# Patient Record
(demographics unavailable — no encounter records)

---

## 2025-02-07 NOTE — PHYSICAL EXAM
[No Acute Distress] : no acute distress [Well Nourished] : well nourished [Well Developed] : well developed [Well-Appearing] : well-appearing [Normal Sclera/Conjunctiva] : normal sclera/conjunctiva [PERRL] : pupils equal round and reactive to light [EOMI] : extraocular movements intact [Normal Outer Ear/Nose] : the outer ears and nose were normal in appearance [Normal Oropharynx] : the oropharynx was normal [No JVD] : no jugular venous distention [No Lymphadenopathy] : no lymphadenopathy [Supple] : supple [Thyroid Normal, No Nodules] : the thyroid was normal and there were no nodules present [No Respiratory Distress] : no respiratory distress  [No Accessory Muscle Use] : no accessory muscle use [Clear to Auscultation] : lungs were clear to auscultation bilaterally [Normal Rate] : normal rate  [Regular Rhythm] : with a regular rhythm [Normal S1, S2] : normal S1 and S2 [No Murmur] : no murmur heard [No Carotid Bruits] : no carotid bruits [No Abdominal Bruit] : a ~M bruit was not heard ~T in the abdomen [No Varicosities] : no varicosities [Pedal Pulses Present] : the pedal pulses are present [No Edema] : there was no peripheral edema [No Palpable Aorta] : no palpable aorta [No Extremity Clubbing/Cyanosis] : no extremity clubbing/cyanosis [Soft] : abdomen soft [Non Tender] : non-tender [Non-distended] : non-distended [No Masses] : no abdominal mass palpated [No HSM] : no HSM [Normal Bowel Sounds] : normal bowel sounds [Normal Posterior Cervical Nodes] : no posterior cervical lymphadenopathy [Normal Anterior Cervical Nodes] : no anterior cervical lymphadenopathy [No CVA Tenderness] : no CVA  tenderness [No Spinal Tenderness] : no spinal tenderness [No Rash] : no rash [Coordination Grossly Intact] : coordination grossly intact [No Focal Deficits] : no focal deficits [Normal Gait] : normal gait [Deep Tendon Reflexes (DTR)] : deep tendon reflexes were 2+ and symmetric [Normal Affect] : the affect was normal [Normal Insight/Judgement] : insight and judgment were intact [de-identified] : left bka

## 2025-02-07 NOTE — HEALTH RISK ASSESSMENT
[No] : No [No falls in past year] : Patient reported no falls in the past year [Little interest or pleasure doing things] : 1) Little interest or pleasure doing things [Feeling down, depressed, or hopeless] : 2) Feeling down, depressed, or hopeless [0] : 2) Feeling down, depressed, or hopeless: Not at all (0) [PHQ-2 Negative - No further assessment needed] : PHQ-2 Negative - No further assessment needed [HIV test declined] : HIV test declined [Hepatitis C test declined] : Hepatitis C test declined [None] : None [With Family] : lives with family [Never] : Never [QEL1Pagkg] : 0 [Change in mental status noted] : No change in mental status noted [Language] : denies difficulty with language [Behavior] : denies difficulty with behavior [Learning/Retaining New Information] : denies difficulty learning/retaining new information [Handling Complex Tasks] : denies difficulty handling complex tasks [Reasoning] : denies difficulty with reasoning [Spatial Ability and Orientation] : denies difficulty with spatial ability and orientation

## 2025-02-07 NOTE — HISTORY OF PRESENT ILLNESS
[Post-hospitalization from ___ Hospital] : Post-hospitalization from [unfilled] Hospital [Admitted on: ___] : The patient was admitted on [unfilled] [Discharged on ___] : discharged on [unfilled] [Patient Contacted By: ____] : and contacted by [unfilled]

## 2025-02-24 NOTE — PHYSICAL EXAM
[Normal Breath Sounds] : Normal breath sounds [2+] : left 2+ [Abdomen Tenderness] : ~T ~M No abdominal tenderness [Alert] : alert [Oriented to Person] : oriented to person [Oriented to Place] : oriented to place [Oriented to Time] : oriented to time [Calm] : calm [de-identified] : Appears well [FreeTextEntry1] : L BKA with sutures and staples in place minimal skin necrosis on medial aspect, non tender to touch [de-identified] : Intact staple/suture incision site Left BKA

## 2025-02-24 NOTE — ASSESSMENT
[FreeTextEntry1] : 52 YO M s/p L BKA  - sutures removed. Return to clinic in 2 weeks for ongoing monitoring

## 2025-02-24 NOTE — HISTORY OF PRESENT ILLNESS
[FreeTextEntry1] : BENJY CHUN is status post LLE BKA and he is here for a post-op visit.   Surgery Date: 1/22/24 52 YO M with uncontrolled DM presents for post op visit after L 2 stage BKA. He is off antibiotics. No fevers/ chills at home.  Patient accompanied by spouse. [de-identified] : Denies fevers at home. Taking augmentin. Here for suture removal

## 2025-02-27 NOTE — HISTORY OF PRESENT ILLNESS
[FreeTextEntry1] : Patient is here with his niece Sirena.  Patient lives with family.   HPI: 52 y/o Azeri speaking M w/ PMH of IDDM II, HTN, HLD, not compliant with medications other than insulin regimen, presents w/ 3 days of worsening foot swelling and pain. He had left above the knee amputee during hospital admission.  Seen Dr. Espinosa [facility]  Quality: Type 2 DM Severity: uncontrolled Duration: 8 years ago  Onset: vomiting went to ER  Modifying Factors: better with insulin Associated Symptoms: left leg amputee due to infection on toe  FAMILY HISTORY: No pertinent family history in first degree relatives  SOCIAL HISTORY: No etoh abuse, no smoking, no recreational drugs  Current Regimen: consistent medication use Metformin 1000 mg BID Insulin Aspart 14 units before meals   Lantus 30 units at bedtime   Self blood sugar monitoring: 3-4 times a day per logs fastin, 67, 132, 63, 137, 159 before breakfast: 148, 198, 232, 157 before lunch: 193, 143, 242, 171, 154 before dinner: 170, 97, 90, 88, 141 Current BG 82  Labs on 2025 A1c 13.5% Creatinine 0.86    Diet: 3 meals a day    Date of last eye exam: 2025 +DR, receiving injections Date of last foot exam: followed by podiatry  Date of last flu vaccine: no Date of last Pneumovax: no  PAST MEDICAL & SURGICAL HISTORY: Diabetes  No significant past surgical history

## 2025-02-27 NOTE — PHYSICAL EXAM
[Alert] : alert [Well Nourished] : well nourished [No Acute Distress] : no acute distress [Well Developed] : well developed [Normal Sclera/Conjunctiva] : normal sclera/conjunctiva [No Proptosis] : no proptosis [No LAD] : no lymphadenopathy [Thyroid Not Enlarged] : the thyroid was not enlarged [No Thyroid Nodules] : no palpable thyroid nodules [No Respiratory Distress] : no respiratory distress [No Accessory Muscle Use] : no accessory muscle use [Normal Rate and Effort] : normal respiratory rate and effort [Clear to Auscultation] : lungs were clear to auscultation bilaterally [Normal S1, S2] : normal S1 and S2 [Normal Rate] : heart rate was normal [Regular Rhythm] : with a regular rhythm [Normal Bowel Sounds] : normal bowel sounds [Not Tender] : non-tender [Soft] : abdomen soft [No Rash] : no rash [Acanthosis Nigricans] : no acanthosis nigricans [No Tremors] : no tremors [Oriented x3] : oriented to person, place, and time [Normal Affect] : the affect was normal [Normal Insight/Judgement] : insight and judgment were intact [Normal Mood] : the mood was normal [de-identified] : left above the knee amputation

## 2025-02-27 NOTE — REVIEW OF SYSTEMS
[Recent Weight Gain (___ Lbs)] : recent weight gain: [unfilled] lbs [Eye Pain] : pain [Blurred Vision] : blurred vision [Fatigue] : no fatigue [Decreased Appetite] : appetite not decreased [Visual Field Defect] : no visual field defect [Dysphagia] : no dysphagia [Neck Pain] : no neck pain [Dysphonia] : no dysphonia [Chest Pain] : no chest pain [Palpitations] : no palpitations [Constipation] : no constipation [Diarrhea] : no diarrhea [Polyuria] : no polyuria [Dysuria] : no dysuria [Headaches] : no headaches [Tremors] : no tremors [Depression] : no depression [Anxiety] : no anxiety [Polydipsia] : no polydipsia [FreeTextEntry3] : sometimes blurry vision, sees eye doctor

## 2025-02-27 NOTE — ASSESSMENT
[FreeTextEntry1] : 51M with PMH of IDDM II, HTN, HLD.  1. Uncontrolled DM2, a1c 13.5% (was taking premeal insulin after meals at home), patient having low blood sugars in the morning and higher blood sugars before breakfast and dinner.  - Start Alogliptin, prescribed by PMD - Increase admelog 16-16-14 units to help with hyperglycemia - Decrease lantus to 26 units qhs to prevent hypoglycemia - Continue Metformin  - Continue correction scale TID with meals - Diabetes education  2. HTN: BP mildly elevated - follow up with PMD for management.   3. HLD: unclear why statin was discontinued, will check lipid panel before next visit. May benefit from starting statin in the future.   RTO in 1-2 weeks with CDE to start CGM RTO in 6 weeks with NP RTO in 6 months with Dr. Espinosa

## 2025-03-02 NOTE — HISTORY OF PRESENT ILLNESS
[FreeTextEntry1] : Hospital Course: Discharge Date	01-Feb-2025 Admission Date	15-William-2025 20:43 Reason for Admission	LLE cellulitis w/ impending nec fasc Hospital Course	 Pt is a 51yMale with a PMHx of T2DM and HTN who presented with left foot ulcer. Patient had excisional debridement of necrotizing fasciitis of the left ankle and foot on 1/16/25 with OR cultures positive for Streptococcus Pyogenes. Patient underwent further debridement and transmetatarsal amputation on 1/17 with repeat OR cultures still with strep pyogenes. Patient was on IV Zosyn, Vanc and Clindamycin. Course also c/b Left knee swelling s/p knee arthrocentesis with Ortho with cultures with NGTD. Patient continued to spike fevers s/p above with persistent leukocytosis prompting Left knee BKA on 1/22/25 and formalization of Left BKA 1/28/25. Patient had prevena wound vac in place x5 days s/p surgery. He was also seen by Endocrine for diabetes management for A1c of 13.5. He completed course of antibiotics and was evaluated by PT with recommended for Home PT. Patient is now stable for discharge with Home PT.   Interval f/u 2/17/25 pt here for f/u visit saw vascular surgeon Dr Grant last week, was started on augmentin for cellulitis at surgical site pt tolerating augmentin denies pain no diarrhea

## 2025-03-02 NOTE — ASSESSMENT
[FreeTextEntry1] : 50 y/o Cymro speaking M w/ PMH of IDDM II, HTN, HLD  Admitted for LLE necrotising fascitis  s/p OR I+D on 1/16/25 ultimately requiring BKA here for post hospitalization visit. He saw vascular surgery last week and was started on augmentin for mild cellulitis at surgical site  LLE necrotising fascitis Uncontrolled DM hba1c 13.2 Cellulitis  - mrsa screen neg - OR culture 1/16 with Streptococcus pyogenes (GAS) - BCX 1/15 ngtd - s/p 1/16/25- I+D and excisional debridement- OR findings=Necrotizing infection of dorsal foot, Skin and subQ debrided down to level of tendon sheaths - s/p 1/17/25 deep I+D with lisfranc amputation - OR cx 1/17 strep pyogenes - s/p left knee arthrocentesis 1/16. low cell count. f/u cultures - ngtd  - initially refused amputation later agreed - now  1/22 s/p L BKA guillotine- OR findings- muscle on medial side seemed viable, muscle on lateral side was questionable - s/p 1/29 L guillotine BKA formalizatio- OR fidnings-> No infection appreciated on revision of BKA stump, distal margin excised.  - saw vascular surgery and started on augmentin for cellulitis at surgical site. Appears improved compared to images from last week. Will continue augmentin - optimize glycemic control - check cbc cmp esr crp  f/u 2 weeks   all questions and concerned addressed interpretation provided by family member [Treatment Education] : treatment education [Treatment Adherence] : treatment adherence [Anticipatory Guidance] : anticipatory guidance

## 2025-03-02 NOTE — PHYSICAL EXAM
[General Appearance - Alert] : alert [General Appearance - In No Acute Distress] : in no acute distress [Auscultation Breath Sounds / Voice Sounds] : lungs were clear to auscultation bilaterally [Heart Rate And Rhythm] : heart rate was normal and rhythm regular [Heart Sounds] : normal S1 and S2 [Heart Sounds Gallop] : no gallops [Murmurs] : no murmurs [Heart Sounds Pericardial Friction Rub] : no pericardial rub [FreeTextEntry1] : left LE stump with staples intact slight erythema swelling improved compared to images reviewed with surgeon last week no warmth.  [Bowel Sounds] : normal bowel sounds [Abdomen Soft] : soft [Abdomen Tenderness] : non-tender [] : no hepato-splenomegaly [Abdomen Mass (___ Cm)] : no abdominal mass palpated [Costovertebral Angle Tenderness] : no CVA tenderness

## 2025-03-02 NOTE — ASSESSMENT
[FreeTextEntry1] : 50 y/o Scottish speaking M w/ PMH of IDDM II, HTN, HLD  Admitted for LLE necrotising fascitis  s/p OR I+D on 1/16/25 ultimately requiring BKA here for post hospitalization visit. He saw vascular surgery last week and was started on augmentin for mild cellulitis at surgical site  LLE necrotising fascitis Uncontrolled DM hba1c 13.2 Cellulitis  - mrsa screen neg - OR culture 1/16 with Streptococcus pyogenes (GAS) - BCX 1/15 ngtd - s/p 1/16/25- I+D and excisional debridement- OR findings=Necrotizing infection of dorsal foot, Skin and subQ debrided down to level of tendon sheaths - s/p 1/17/25 deep I+D with lisfranc amputation - OR cx 1/17 strep pyogenes - s/p left knee arthrocentesis 1/16. low cell count. f/u cultures - ngtd  - initially refused amputation later agreed - now  1/22 s/p L BKA guillotine- OR findings- muscle on medial side seemed viable, muscle on lateral side was questionable - s/p 1/29 L guillotine BKA formalizatio- OR fidnings-> No infection appreciated on revision of BKA stump, distal margin excised.  - saw vascular surgery and started on augmentin for cellulitis at surgical site. Appears improved compared to images from last week. Will continue augmentin - optimize glycemic control - check cbc cmp esr crp  f/u 2 weeks   all questions and concerned addressed interpretation provided by family member [Treatment Education] : treatment education [Treatment Adherence] : treatment adherence [Anticipatory Guidance] : anticipatory guidance

## 2025-03-02 NOTE — REASON FOR VISIT
[Post Hospitalization] : a post hospitalization visit
[Post Hospitalization] : a post hospitalization visit
17

## 2025-03-03 NOTE — ASSESSMENT
[FreeTextEntry1] : 50 y/o Chinese speaking M w/ PMH of IDDM II, HTN, HLD  Admitted for LLE necrotising fascitis  s/p OR I+D on 1/16/25 ultimately requiring BKA here for post hospitalization visit. He saw vascular surgery last week and was started on augmentin for mild cellulitis at surgical site  LLE necrotising fascitis Uncontrolled DM hba1c 13.2 Cellulitis  - mrsa screen neg - OR culture 1/16 with Streptococcus pyogenes (GAS) - BCX 1/15 ngtd - s/p 1/16/25- I+D and excisional debridement- OR findings=Necrotizing infection of dorsal foot, Skin and subQ debrided down to level of tendon sheaths - s/p 1/17/25 deep I+D with lisfranc amputation - OR cx 1/17 strep pyogenes - s/p left knee arthrocentesis 1/16. low cell count. f/u cultures - ngtd  - initially refused amputation later agreed - now  1/22 s/p L BKA guillotine- OR findings- muscle on medial side seemed viable, muscle on lateral side was questionable - s/p 1/29 L guillotine BKA formalizatio- OR fidnings-> No infection appreciated on revision of BKA stump, distal margin excised.  - saw vascular surgery and started on augmentin for cellulitis at surgical site. Appears improved compared to images from last week. Will continue augmentin - optimize glycemic control - check cbc cmp esr crp  f/u 2 weeks   all questions and concerned addressed interpretation provided by family member [Treatment Education] : treatment education [Treatment Adherence] : treatment adherence [Anticipatory Guidance] : anticipatory guidance

## 2025-03-03 NOTE — HISTORY OF PRESENT ILLNESS
[FreeTextEntry1] : Hospital Course: Discharge Date	01-Feb-2025 Admission Date	15-William-2025 20:43 Reason for Admission	LLE cellulitis w/ impending nec fasc Hospital Course	 Pt is a 51yMale with a PMHx of T2DM and HTN who presented with left foot ulcer. Patient had excisional debridement of necrotizing fasciitis of the left ankle and foot on 1/16/25 with OR cultures positive for Streptococcus Pyogenes. Patient underwent further debridement and transmetatarsal amputation on 1/17 with repeat OR cultures still with strep pyogenes. Patient was on IV Zosyn, Vanc and Clindamycin. Course also c/b Left knee swelling s/p knee arthrocentesis with Ortho with cultures with NGTD. Patient continued to spike fevers s/p above with persistent leukocytosis prompting Left knee BKA on 1/22/25 and formalization of Left BKA 1/28/25. Patient had prevena wound vac in place x5 days s/p surgery. He was also seen by Endocrine for diabetes management for A1c of 13.5. He completed course of antibiotics and was evaluated by PT with recommended for Home PT. Patient is now stable for discharge with Home PT.   Interval f/u 2/17/25 pt here for f/u visit saw vascular surgeon Dr Grant last week, was started on augmentin for cellulitis at surgical site pt tolerating augmentin denies pain no diarrhea   Interval f/u 3/3/25 last day augmentin today feels good staples removed some scab at medial end otherwise healed  plan complete augmentin today f/u vascular f/u ID as needed

## 2025-03-07 NOTE — ASSESSMENT
[FreeTextEntry1] : dm doing better bp stable renew meds hld to be checked bka next step will be prosthetic

## 2025-03-07 NOTE — HISTORY OF PRESENT ILLNESS
[FreeTextEntry1] : follow up  [de-identified] : follow up iddm s/p left amputation has been doing great

## 2025-03-07 NOTE — HEALTH RISK ASSESSMENT
[No] : No [No falls in past year] : Patient reported no falls in the past year [Little interest or pleasure doing things] : 1) Little interest or pleasure doing things [0] : 2) Feeling down, depressed, or hopeless: Not at all (0) [PHQ-2 Negative - No further assessment needed] : PHQ-2 Negative - No further assessment needed [[id number]] : 0 [Never] : Never

## 2025-03-10 NOTE — ASSESSMENT
[FreeTextEntry1] : 52 YO M s/p L BKA with medial incision necrosis. Medial incision debrided in office today. Silvercell dressing, followed by kerlix and ace bandage applied. Follow up in 2 weeks for wound check.

## 2025-03-10 NOTE — PHYSICAL EXAM
[Normal Rate and Rhythm] : normal rate and rhythm [Alert] : alert [Oriented to Person] : oriented to person [Oriented to Place] : oriented to place [Oriented to Time] : oriented to time [Calm] : calm [de-identified] : Appears well, in no acute distress. [de-identified] : normocephalic, atraumatic [de-identified] :   normal respiratory effort. unlabored breathing. [de-identified] : L BKA incision with minimal medial skin necrosis, no signs of infection

## 2025-03-10 NOTE — HISTORY OF PRESENT ILLNESS
[FreeTextEntry1] : BENJY CHUN is status post LLE BKA and he is here for a post-op visit.   Surgery Date: 1/22/24 52 YO M with uncontrolled DM presents for post op visit after L 2 stage BKA. He is off antibiotics. No fevers/ chills at home.  Patient accompanied by spouse. [de-identified] : Patient s/p JANET HOLLIS presents for follow up. He has completed Augmentin. Denies fevers and chills.

## 2025-04-07 NOTE — PHYSICAL EXAM
[Normal Breath Sounds] : Normal breath sounds [2+] : left 2+ [Abdomen Tenderness] : ~T ~M No abdominal tenderness [Alert] : alert [Oriented to Person] : oriented to person [Oriented to Place] : oriented to place [Oriented to Time] : oriented to time [Calm] : calm [de-identified] : Appears well [FreeTextEntry1] : healed L BKA pencil eraser size area of denuded skin

## 2025-04-07 NOTE — ASSESSMENT
[FreeTextEntry1] : 50 YO M with uncontrolled DM now 2 months post op from 2 stage L BKA. Doing well - BKA incision is now healed. He may leave it open to air and begin fitting for prosthetic - return to vascular clinic as needed

## 2025-04-07 NOTE — HISTORY OF PRESENT ILLNESS
[FreeTextEntry1] : BENJY CHUN is status post LLE BKA and he is here for a post-op visit.   Surgery Date: 1/22/24 52 YO M with uncontrolled DM presents for post op visit after L 2 stage BKA. He is off antibiotics. No fevers/ chills at home.  Patient accompanied by spouse. [de-identified] : Patient returns for incision check. Staples all removed. Has been applying daily xeroform to medial aspect of incision. The incision is now nearly completely healed. He has a superficial area the size of pencil eraser that remains open

## 2025-04-21 NOTE — ASSESSMENT
[FreeTextEntry1] : 51-year-old male s/p LLE BKA with suture protruding. Area was cleansed with betadine and protruding PDS suture was trimmed and removed. Skin intact small Band-Aid applied. Overlying scab from medial incision was removed revealing new healed skin.  Patient and daughter was advised to follow up if he develops any open wounds.

## 2025-04-21 NOTE — PHYSICAL EXAM
[Normal Rate and Rhythm] : normal rate and rhythm [Alert] : alert [Oriented to Person] : oriented to person [Oriented to Place] : oriented to place [Oriented to Time] : oriented to time [Calm] : calm [de-identified] : Appears well, in no acute distress. [de-identified] : normocephalic, atraumatic [de-identified] :   normal respiratory effort. unlabored breathing. [de-identified] : LLE BKA incision healed with Left medial incision superficial pencil eraser sized area with lifting scab, mid incision there is tail of suture protruding healed incision, no staples incision otherwise closed, no signs of infection

## 2025-04-21 NOTE — HISTORY OF PRESENT ILLNESS
[de-identified] : Patient presents for follow up accompanied by daughter reporting staple was left in L BKA incision. He had staple and suture removal on April 7th. Daughter reports they felt a staple under the skin about 2 weeks ago. Denies lower extremity pain, drainage, fevers and chills. Otherwise doing well LLE BKA essentially healed except for medial incision scab which reports was previously open. Reports had been putting xeroform but for last week been open to air and has healed now with a scab. Daughter reports he is doing well A1c reduced from 13 to 6.

## 2025-05-09 NOTE — ASSESSMENT
[FreeTextEntry1] : Diabetic peripheral neuropathy Educated on risks dont walk barefoot States is painful interested in gabapentin/lyrica started on low dose gabapentin for pain relief discussed risks/benefits/potential side effects encouraged diabetic diet  I then counseled the patient on performing self-examination of the feet on a daily basis. I explained the importance of this due to the diminished sensation and the greater susceptibility of getting an infection and having additional complications due to the medical condition that exists, especially if they lack protective sensation on their feet. I advised the patient to notify the office right away if increased redness, swelling, pain, open wounds or discharge were observed. I explained the importance of checking the glucose regularly and of keeping the glucose level between 90 -110 and more importantly controlling the HbA1C keeping consistent and trying to achieve as close to normal and HbA1C as possible around 6.0. I told the patient to notify the MD if the glucose level changed to above or below those levels. Advised to check feet daily and do not walk barefoot  Hx of left bka has prosthetic appointment encouraged physical therapy   #onychomycosis Discussed diagnosis and treatment with patient  Discussed etiology of symptoms patient is experiencing  Aseptic debridement of nails 1-5 right reducing the length with a sterile nail clipper manually and reduced girth by power cosme  Rx antifungal topical bid  Discussed proper shoe gear with patient  Discussed the importance of daily foot exams and pedal hygiene  Explained that the oral antifungal therapy will require 3 months in duration, and that patient must let nails grow out for total 9-12 months to observe clearance of nail mycosis  PTR 3 month evaluate neuropathy pain, high risk foot care

## 2025-05-09 NOTE — HISTORY OF PRESENT ILLNESS
[FreeTextEntry1] : BENJY  is a 51 year old male diagnosed with diabetes in 2017  Patient had BKA  left January 2025  Patient was initially seen at [facility]. He is present for high risk foot exam as recommended. Denies pain or open wounds to the right foot  Sees endocrinologist and vascular  last a1c 6.4% March hx of a1c >13  FBS 87  admits burning tingling foot

## 2025-05-09 NOTE — PHYSICAL EXAM
[General Appearance - Alert] : alert [General Appearance - In No Acute Distress] : in no acute distress [2+] : right foot dorsalis pedis 2+ [Vibration Dec.] : diminished vibratory sensation at the level of the toes [FreeTextEntry3] : LORETTA HOLLIS  [FreeTextEntry1] : Thickened mycotic nails x 5 Right foot No open lesions or lacerations  [FreeTextEntry4] : Loss of protective sensation right foot

## 2025-05-15 NOTE — PHYSICAL EXAM
[Normal Breath Sounds] : Normal breath sounds [2+] : left 2+ [Abdomen Tenderness] : ~T ~M No abdominal tenderness [Alert] : alert [Oriented to Person] : oriented to person [Oriented to Place] : oriented to place [Oriented to Time] : oriented to time [Calm] : calm [de-identified] : Appears well [FreeTextEntry1] : L BKA central area of erythema with dime sized ulceration

## 2025-05-15 NOTE — HISTORY OF PRESENT ILLNESS
[FreeTextEntry1] : BENJY CHUN is status post LLE BKA and he is here for a post-op visit.   Surgery Date: 1/22/24 50 YO M with uncontrolled DM presents for post op visit after L 2 stage BKA. He is off antibiotics. No fevers/ chills at home.  Patient accompanied by spouse. [de-identified] : Patient returns for small area of ulceration and erythema in central area of L BKA. Denies fevers/ chills at home and has not started wearing prosthetic yet

## 2025-05-15 NOTE — ASSESSMENT
[FreeTextEntry1] : 50 YO M with uncontrolled DM now 2 months post op from 2 stage L BKA with central area of superficial ulceration and cellulitis - I have discussed the case with Dr. Danyell Jimenez from ID who agrees patient will benefit from course of augmentin 875mg BID for 7 days - Return to vascular clinic in one week

## 2025-05-21 NOTE — ASSESSMENT
[FreeTextEntry1] : 52 YO M with uncontrolled DM now 2 months post op from 2 stage L BKA with central area of superficial ulceration and cellulitis -Patient completed 1 week of augmentin 875mg BID . Erythema improved.  I have discussed this case with Dr. LAZARUS Mendiola from infectious disease who request another week of antibiotics we will prescribe this Return to vascular clinic in 1 week he will also see infectious disease at that time

## 2025-05-21 NOTE — HISTORY OF PRESENT ILLNESS
[FreeTextEntry1] : BENJY CHUN is status post LLE BKA and he is here for a post-op visit.   Surgery Date: 1/22/24 50 YO M with uncontrolled DM presents for post op visit after L 2 stage BKA. He is off antibiotics. No fevers/ chills at home.  Patient accompanied by spouse. [de-identified] : Patient with L BKA small central  area of ulceration presents for follow up. He has completed 1 week of Augmentin. Denies fevers and chills.

## 2025-05-28 NOTE — ASSESSMENT
[FreeTextEntry1] : 51M with IDDM II, HTN, HLD.  1. Controlled DM2, a1c 6.4%, higher blood sugars throughout the day based on CGM - Continue Alogliptin, prescribed by PMD - Increase admelog by 2 additional units to help with hyperglycemia - Increase lantus to 29 units qhs to prevent hyperglycemia - Restart taking Metformin consistently  - Diabetes education, keep upcoming appointment with CDE  2. HTN: BP acceptable - follow up with PMD for management, continue monitor home BP  3. HLD: controlled, continue statin  4. Elevated TSH with normal Free T4, no medication recommended at this time, repeat TFTs before next visit.  Anemia - follow up with PMD. Continue to monitor.   RTO in 3 months with NP RTO in 6 months with Dr. Espinosa.

## 2025-05-28 NOTE — PHYSICAL EXAM
[Alert] : alert [Well Nourished] : well nourished [No Acute Distress] : no acute distress [Well Developed] : well developed [Normal Sclera/Conjunctiva] : normal sclera/conjunctiva [No Proptosis] : no proptosis [No LAD] : no lymphadenopathy [Thyroid Not Enlarged] : the thyroid was not enlarged [No Thyroid Nodules] : no palpable thyroid nodules [No Respiratory Distress] : no respiratory distress [No Accessory Muscle Use] : no accessory muscle use [Normal Rate and Effort] : normal respiratory rate and effort [Clear to Auscultation] : lungs were clear to auscultation bilaterally [Normal S1, S2] : normal S1 and S2 [Normal Rate] : heart rate was normal [Regular Rhythm] : with a regular rhythm [Normal Bowel Sounds] : normal bowel sounds [Not Tender] : non-tender [Soft] : abdomen soft [No Stigmata of Cushings Syndrome] : no stigmata of Cushings Syndrome [No Rash] : no rash [Acanthosis Nigricans] : no acanthosis nigricans [No Tremors] : no tremors [Oriented x3] : oriented to person, place, and time [Normal Affect] : the affect was normal [Normal Insight/Judgement] : insight and judgment were intact [Normal Mood] : the mood was normal [de-identified] : Left ABK amputee

## 2025-05-28 NOTE — HISTORY OF PRESENT ILLNESS
[Continuous Glucose Monitoring] : Continuous Glucose Monitoring: Yes [Dexcom] : Dexcom [FreeTextEntry1] : Patient is here with his niece Sirena. Patient lives with family.  No changes since last visit.   HPI: 50 y/o Occitan speaking M w/ PMH of IDDM II, HTN, HLD, not compliant with medications other than insulin regimen, presents w/ 3 days of worsening foot swelling and pain. He had left above the knee amputee during hospital admission. Seen Dr. Espinosa [facility]  Quality: Type 2 DM Severity: uncontrolled Duration: 8 years ago Onset: vomiting went to ER Modifying Factors: better with insulin Associated Symptoms: left leg amputee due to infection on toe FAMILY HISTORY: No pertinent family history in first degree relatives  SOCIAL HISTORY: No etoh abuse, no smoking, no recreational drugs  Current Regimen: consistent medication use Metformin 1000 mg BID - not taking consisently   Insulin Aspart 6-8 units before meals Lantus 26 units at bedtime  Self blood sugar monitoring: 3-4 times a day, Dexcom  Current BG 94  Labs  A1c 6.4%   Diet: 3 meals a day   Date of last eye exam: 1/2025 +DR, receiving injections Date of last foot exam: followed by podiatry Date of last flu vaccine: no Date of last Pneumovax: no  PAST MEDICAL & SURGICAL HISTORY: Diabetes  No significant past surgical history [FreeTextEntry2] : 32 [FreeTextEntry3] : 68 [FreeTextEntry4] : 0 [de-identified] : 8.2 [FreeTextEntry5] : 206 [FreeTextEntry6] : 27.6 [FreeTextEntry7] : 57

## 2025-05-28 NOTE — REVIEW OF SYSTEMS
[Blurred Vision] : blurred vision [Heartburn] : heartburn [Fatigue] : no fatigue [Decreased Appetite] : appetite not decreased [Recent Weight Gain (___ Lbs)] : no recent weight gain [Recent Weight Loss (___ Lbs)] : no recent weight loss [Visual Field Defect] : no visual field defect [Dysphagia] : no dysphagia [Neck Pain] : no neck pain [Dysphonia] : no dysphonia [Chest Pain] : no chest pain [Palpitations] : no palpitations [Constipation] : no constipation [Diarrhea] : no diarrhea [Polyuria] : no polyuria [Dysuria] : no dysuria [Headaches] : no headaches [Tremors] : no tremors [Depression] : no depression [Anxiety] : no anxiety [Polydipsia] : no polydipsia [FreeTextEntry2] : weight stable [FreeTextEntry3] : cloudy vision, seeing Sight MD

## 2025-06-13 NOTE — HISTORY OF PRESENT ILLNESS
[FreeTextEntry1] : follow up iddm htn hld pvd s/p left bka [de-identified] : follow up iddm htn hld pvd has new prosthetic, has been doing well no chest pain sob nvd or palpitations

## 2025-06-13 NOTE — ASSESSMENT
[FreeTextEntry1] : iddm much better not taking metformin might not need it will discontinue bp stable today a bit high at vascular hld check lipid elevated tsh check thryroid

## 2025-06-13 NOTE — PHYSICAL EXAM
[No Acute Distress] : no acute distress [Well Nourished] : well nourished [Well Developed] : well developed [Well-Appearing] : well-appearing [Normal Sclera/Conjunctiva] : normal sclera/conjunctiva [PERRL] : pupils equal round and reactive to light [EOMI] : extraocular movements intact [Normal Outer Ear/Nose] : the outer ears and nose were normal in appearance [Normal Oropharynx] : the oropharynx was normal [No JVD] : no jugular venous distention [No Lymphadenopathy] : no lymphadenopathy [Supple] : supple [Thyroid Normal, No Nodules] : the thyroid was normal and there were no nodules present [No Respiratory Distress] : no respiratory distress  [No Accessory Muscle Use] : no accessory muscle use [Clear to Auscultation] : lungs were clear to auscultation bilaterally [Normal Rate] : normal rate  [Regular Rhythm] : with a regular rhythm [Normal S1, S2] : normal S1 and S2 [No Murmur] : no murmur heard [No Carotid Bruits] : no carotid bruits [No Abdominal Bruit] : a ~M bruit was not heard ~T in the abdomen [No Varicosities] : no varicosities [Pedal Pulses Present] : the pedal pulses are present [No Edema] : there was no peripheral edema [No Palpable Aorta] : no palpable aorta [No Extremity Clubbing/Cyanosis] : no extremity clubbing/cyanosis [Soft] : abdomen soft [Non Tender] : non-tender [Non-distended] : non-distended [No Masses] : no abdominal mass palpated [No HSM] : no HSM [Normal Bowel Sounds] : normal bowel sounds [Normal Posterior Cervical Nodes] : no posterior cervical lymphadenopathy [Normal Anterior Cervical Nodes] : no anterior cervical lymphadenopathy [No CVA Tenderness] : no CVA  tenderness [No Spinal Tenderness] : no spinal tenderness [No Joint Swelling] : no joint swelling [Grossly Normal Strength/Tone] : grossly normal strength/tone [No Rash] : no rash [Coordination Grossly Intact] : coordination grossly intact [No Focal Deficits] : no focal deficits [Normal Gait] : normal gait [Deep Tendon Reflexes (DTR)] : deep tendon reflexes were 2+ and symmetric [Normal Affect] : the affect was normal [Normal Insight/Judgement] : insight and judgment were intact [de-identified] : left bka

## 2025-06-13 NOTE — HEALTH RISK ASSESSMENT
[No] : No [No falls in past year] : Patient reported no falls in the past year [Little interest or pleasure doing things] : 1) Little interest or pleasure doing things [Feeling down, depressed, or hopeless] : 2) Feeling down, depressed, or hopeless [0] : 2) Feeling down, depressed, or hopeless: Not at all (0) [PHQ-2 Negative - No further assessment needed] : PHQ-2 Negative - No further assessment needed [[id number]] : 0 [Never] : Never

## 2025-06-23 NOTE — HISTORY OF PRESENT ILLNESS
[FreeTextEntry1] : BENJY CHUN is status post LLE BKA and he is here for a post-op visit. Surgery Date: 1/22/24 52 YO M with uncontrolled DM presents for post op visit after L 2 stage BKA. He is off antibiotics. No fevers/ chills at home.  Patient accompanied by spouse.    5/21/25 Interval History: Patient with L BKA small central area of ulceration presents for follow up. He has completed 1 week of Augmentin. Denies fevers and chills. [de-identified] : Patient presents for follow up L BKA ulceration which has now healed. Denies fevers and chills. He has been using his prosthesis without issues. Reports intermittent right knee pain.

## 2025-06-23 NOTE — ASSESSMENT
[FreeTextEntry1] : 50 YO M with uncontrolled DM now 2 months post op from 2 stage L BKA with prior central area of superficial ulceration and cellulitis which has now healed. Completed antibiotics. Patient using prosthesis without issues. Follow up as needed. Follow up with PMD for R knee pain.

## 2025-06-23 NOTE — PHYSICAL EXAM
[Normal Rate and Rhythm] : normal rate and rhythm [Alert] : alert [Oriented to Person] : oriented to person [Oriented to Place] : oriented to place [Oriented to Time] : oriented to time [Abdomen Tenderness] : ~T ~M No abdominal tenderness [Skin Ulcer] : no ulcer [de-identified] : Appears well, in no acute distress.  [de-identified] : normocephalic, atraumatic  [de-identified] :   normal respiratory effort. unlabored breathing.  [de-identified] : L BKA medal ulceration healed, no signs of infection